# Patient Record
Sex: FEMALE | Race: WHITE | NOT HISPANIC OR LATINO | Employment: OTHER | URBAN - METROPOLITAN AREA
[De-identification: names, ages, dates, MRNs, and addresses within clinical notes are randomized per-mention and may not be internally consistent; named-entity substitution may affect disease eponyms.]

---

## 2024-10-06 ENCOUNTER — OFFICE VISIT (OUTPATIENT)
Dept: URGENT CARE | Facility: CLINIC | Age: 84
End: 2024-10-06
Payer: MEDICARE

## 2024-10-06 VITALS
HEART RATE: 67 BPM | BODY MASS INDEX: 33.13 KG/M2 | TEMPERATURE: 97.9 F | DIASTOLIC BLOOD PRESSURE: 77 MMHG | SYSTOLIC BLOOD PRESSURE: 130 MMHG | WEIGHT: 187 LBS | HEIGHT: 63 IN | OXYGEN SATURATION: 96 %

## 2024-10-06 DIAGNOSIS — R09.81 NASAL CONGESTION: ICD-10-CM

## 2024-10-06 DIAGNOSIS — J01.10 ACUTE NON-RECURRENT FRONTAL SINUSITIS: Primary | ICD-10-CM

## 2024-10-06 LAB
POC RAPID INFLUENZA A: NEGATIVE
POC RAPID INFLUENZA B: NEGATIVE
POC SARS-COV-2 AG BINAX: NORMAL

## 2024-10-06 PROCEDURE — 87811 SARS-COV-2 COVID19 W/OPTIC: CPT | Mod: CLIA WAIVED TEST | Performed by: PHYSICIAN ASSISTANT

## 2024-10-06 PROCEDURE — 87804 INFLUENZA ASSAY W/OPTIC: CPT | Mod: CLIA WAIVED TEST | Performed by: PHYSICIAN ASSISTANT

## 2024-10-06 PROCEDURE — 99204 OFFICE O/P NEW MOD 45 MIN: CPT | Performed by: PHYSICIAN ASSISTANT

## 2024-10-06 PROCEDURE — 1159F MED LIST DOCD IN RCRD: CPT | Performed by: PHYSICIAN ASSISTANT

## 2024-10-06 RX ORDER — EZETIMIBE 10 MG/1
10 TABLET ORAL
COMMUNITY
Start: 2024-08-19

## 2024-10-06 RX ORDER — ALLOPURINOL 100 MG/1
100 TABLET ORAL
COMMUNITY
Start: 2024-08-19 | End: 2025-08-19

## 2024-10-06 RX ORDER — CARVEDILOL 3.12 MG/1
3.12 TABLET ORAL
COMMUNITY
Start: 2024-07-29

## 2024-10-06 RX ORDER — INSULIN GLARGINE 100 [IU]/ML
INJECTION, SOLUTION SUBCUTANEOUS
COMMUNITY

## 2024-10-06 RX ORDER — AZELASTINE 1 MG/ML
1 SPRAY, METERED NASAL 2 TIMES DAILY
COMMUNITY
Start: 2024-07-29 | End: 2025-07-29

## 2024-10-06 RX ORDER — AMOXICILLIN AND CLAVULANATE POTASSIUM 875; 125 MG/1; MG/1
1 TABLET, FILM COATED ORAL 2 TIMES DAILY
Qty: 20 TABLET | Refills: 0 | Status: SHIPPED | OUTPATIENT
Start: 2024-10-06 | End: 2024-10-16

## 2024-10-06 RX ORDER — IPRATROPIUM BROMIDE 42 UG/1
2 SPRAY, METERED NASAL 4 TIMES DAILY
Qty: 15 ML | Refills: 0 | Status: SHIPPED | OUTPATIENT
Start: 2024-10-06 | End: 2024-10-13

## 2024-10-06 RX ORDER — DAPAGLIFLOZIN 10 MG/1
1 TABLET, FILM COATED ORAL
COMMUNITY

## 2024-10-06 RX ORDER — CARBAMAZEPINE 200 MG/1
200 TABLET ORAL 2 TIMES DAILY
COMMUNITY

## 2024-10-06 RX ORDER — FLUTICASONE PROPIONATE 50 MCG
SPRAY, SUSPENSION (ML) NASAL
COMMUNITY
Start: 2024-04-17

## 2024-10-06 RX ORDER — LEVOCETIRIZINE DIHYDROCHLORIDE 5 MG/1
5 TABLET, FILM COATED ORAL
COMMUNITY
Start: 2024-08-28

## 2024-10-06 RX ORDER — CHOLECALCIFEROL (VITAMIN D3) 25 MCG
1 TABLET ORAL DAILY
COMMUNITY

## 2024-10-06 RX ORDER — CALCIUM CARBONATE 600 MG
1 TABLET ORAL 2 TIMES DAILY
COMMUNITY
Start: 2019-05-29

## 2024-10-06 RX ORDER — GABAPENTIN 100 MG/1
100 CAPSULE ORAL 2 TIMES DAILY
COMMUNITY
Start: 2024-07-16

## 2024-10-06 RX ORDER — NITROGLYCERIN 0.4 MG/1
0.4 TABLET SUBLINGUAL EVERY 5 MIN PRN
COMMUNITY
Start: 2023-12-19

## 2024-10-06 RX ORDER — ROSUVASTATIN CALCIUM 40 MG/1
40 TABLET, COATED ORAL NIGHTLY
COMMUNITY
Start: 2024-08-19 | End: 2025-08-19

## 2024-10-06 RX ORDER — MECLIZINE HCL 12.5 MG 12.5 MG/1
12.5 TABLET ORAL 3 TIMES DAILY PRN
COMMUNITY
Start: 2023-12-06 | End: 2024-12-05

## 2024-10-06 RX ORDER — OLOPATADINE HYDROCHLORIDE 2 MG/ML
SOLUTION/ DROPS OPHTHALMIC
COMMUNITY
Start: 2024-04-17

## 2024-10-06 RX ORDER — FLASH GLUCOSE SENSOR
KIT MISCELLANEOUS
COMMUNITY
Start: 2024-07-29

## 2024-10-06 RX ORDER — INSULIN GLARGINE-YFGN 100 [IU]/ML
INJECTION, SOLUTION SUBCUTANEOUS
COMMUNITY
Start: 2024-02-05

## 2024-10-06 RX ORDER — ACETAMINOPHEN AND CODEINE PHOSPHATE 300; 30 MG/1; MG/1
1 TABLET ORAL 2 TIMES DAILY PRN
COMMUNITY
Start: 2019-02-06

## 2024-10-06 RX ORDER — BACLOFEN 10 MG/1
10 TABLET ORAL 2 TIMES DAILY PRN
COMMUNITY
Start: 2023-08-04

## 2024-10-06 RX ORDER — DAPAGLIFLOZIN 10 MG/1
1 TABLET, FILM COATED ORAL DAILY
COMMUNITY
Start: 2024-09-09

## 2024-10-06 RX ORDER — MULTIVITAMIN
1 TABLET ORAL DAILY
COMMUNITY
Start: 2019-05-29

## 2024-10-06 RX ORDER — INSULIN GLARGINE 100 [IU]/ML
26 INJECTION, SOLUTION SUBCUTANEOUS NIGHTLY
COMMUNITY
Start: 2024-07-23 | End: 2025-07-23

## 2024-10-06 RX ORDER — CARBAMAZEPINE 200 MG/1
CAPSULE, EXTENDED RELEASE ORAL EVERY 12 HOURS
COMMUNITY
Start: 2019-05-29

## 2024-10-06 RX ORDER — UBROGEPANT 100 MG/1
TABLET ORAL
COMMUNITY
Start: 2024-04-02

## 2024-10-06 RX ORDER — TORSEMIDE 20 MG/1
40 TABLET ORAL
COMMUNITY
Start: 2024-07-29

## 2024-10-06 RX ORDER — OMEPRAZOLE 40 MG/1
40 CAPSULE, DELAYED RELEASE ORAL
COMMUNITY
Start: 2024-08-19

## 2024-10-06 RX ORDER — NYSTATIN 100000 U/G
CREAM TOPICAL 2 TIMES DAILY PRN
COMMUNITY
Start: 2023-10-09

## 2024-10-06 RX ORDER — AMLODIPINE BESYLATE 10 MG/1
10 TABLET ORAL
COMMUNITY
Start: 2024-05-16

## 2024-10-06 NOTE — PROGRESS NOTES
"Subjective   History  Marianela Gorman is a 84 y.o. female who presents for Shortness of Breath.    Pt c/o SOB - \"but it is more like she can't breathe out of her nose. She has chronic SOB due to her chronic conditions.\" Feels like it might be her sinuses. She is having a lot of mucous in the throat, sinus pressure in the front of her head, and baggy under eyes. States that she was at the cardiologist a few days ago \"told her that her heart is great.\" Her last A1c was 7.8%, average sugar is around 200. She is seeing endocrinologist. Last checked her sugar today a few hrs ago, was 204. States that her CKD has been stable - stayed at stage 4 for several years. BP usually stable. Has decreased appetite. Last time she was in [facility] was in 2008. Denies: fever, chills, dizziness, CP, SOB, abdominal pain, N/V, rash, swelling, bruising, ear pain, loss of sense of taste/smell.      History provided by:  Patient   used: No      Past Surgical History:   Procedure Laterality Date    BACK SURGERY  08/24/2018    Back Surgery    GALLBLADDER SURGERY  08/24/2018    Gallbladder Surgery    HIP SURGERY  08/24/2018    Hip Surgery    HYSTERECTOMY  08/24/2018    Hysterectomy    KNEE SURGERY  08/24/2018    Knee Surgery    SPINE SURGERY  08/24/2018    Spine Repair     Social History     Tobacco Use    Smoking status: Never    Smokeless tobacco: Never   Substance Use Topics    Drug use: Never         Review of Systems   All other systems reviewed and are negative.      Objective     Vital Signs  /77 (BP Location: Left arm, Patient Position: Sitting, BP Cuff Size: Small adult)   Pulse 67   Temp 36.6 °C (97.9 °F) (Oral)   Ht 1.6 m (5' 3\")   Wt 84.8 kg (187 lb)   SpO2 96%   BMI 33.13 kg/m²    All vitals have been reviewed and are stable.       Physical Exam  Vitals reviewed.   Constitutional:       General: She is awake.      Appearance: Normal appearance. She is well-developed.   HENT:      Head: Normocephalic " and atraumatic.      Right Ear: Tympanic membrane and ear canal normal.      Left Ear: Tympanic membrane and ear canal normal.      Nose: Congestion present.      Right Turbinates: Enlarged.      Left Turbinates: Enlarged.      Right Sinus: Frontal sinus tenderness present.      Left Sinus: Frontal sinus tenderness present.      Mouth/Throat:      Lips: Pink.      Mouth: Mucous membranes are moist.      Pharynx: Uvula midline. Posterior oropharyngeal erythema and postnasal drip present.   Eyes:      General: Allergic shiner present.   Cardiovascular:      Rate and Rhythm: Normal rate and regular rhythm.   Pulmonary:      Effort: Pulmonary effort is normal.      Breath sounds: Normal breath sounds.   Musculoskeletal:      Cervical back: Full passive range of motion without pain.      Right lower leg: No edema.      Left lower leg: No edema.   Skin:     General: Skin is warm and dry.      Findings: No lesion or rash.   Neurological:      General: No focal deficit present.      Mental Status: She is alert and oriented to person, place, and time.      Cranial Nerves: No facial asymmetry.      Motor: Motor function is intact.      Gait: Gait is intact.   Psychiatric:         Attention and Perception: Attention normal.         Mood and Affect: Mood and affect normal.       Assessment/Plan       Problem List Items Addressed This Visit    None  Visit Diagnoses       Acute non-recurrent frontal sinusitis    -  Primary    Relevant Medications    amoxicillin-pot clavulanate (Augmentin) 875-125 mg tablet    ipratropium (Atrovent) 42 mcg (0.06 %) nasal spray    Nasal congestion        Relevant Orders    POCT Bin[facility]NOW Covid-19 Ag Card manually resulted (Completed)    Sars-CoV-2 PCR (Completed)    Influenza A, and B PCR (Completed)    POCT Influenza A/B manually resulted (Completed)          At this time, VS are stable and pt appears to have Sx consistent with a sinus infection. Pt denies any more SOB coming from the chest than  what she luevano at baseline due to chronic conditions. Due to complex medical history, I did note that any worsening of Sx such as fever, true SOB, or increased weakness should be assessed at the ER with labs and imaging. 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 Will continue to monitor her Sx. Increase rest and fluids. Medications Rx as above. Negative POCT COVID and flu testing.     Red flag symptoms reviewed with patient and all questions answered. Patient or parent/guardian verbalized understanding and agreement with care plan as above. All in office testing reviewed with patient. If symptoms worsen or do not improve, patient is to follow up with PCP or report to the ER.      UC Course  Patient disposition: Home

## 2024-10-07 LAB
FLUAV RNA RESP QL NAA+PROBE: NOT DETECTED
FLUBV RNA RESP QL NAA+PROBE: NOT DETECTED
SARS-COV-2 RNA RESP QL NAA+PROBE: NOT DETECTED